# Patient Record
Sex: FEMALE | Race: BLACK OR AFRICAN AMERICAN | Employment: FULL TIME | URBAN - METROPOLITAN AREA
[De-identification: names, ages, dates, MRNs, and addresses within clinical notes are randomized per-mention and may not be internally consistent; named-entity substitution may affect disease eponyms.]

---

## 2020-04-28 ENCOUNTER — OFFICE VISIT (OUTPATIENT)
Dept: OBGYN CLINIC | Facility: CLINIC | Age: 50
End: 2020-04-28
Payer: COMMERCIAL

## 2020-04-28 VITALS
BODY MASS INDEX: 22.15 KG/M2 | SYSTOLIC BLOOD PRESSURE: 150 MMHG | HEIGHT: 63 IN | DIASTOLIC BLOOD PRESSURE: 88 MMHG | WEIGHT: 125 LBS

## 2020-04-28 DIAGNOSIS — D21.9 FIBROIDS: Primary | ICD-10-CM

## 2020-04-28 DIAGNOSIS — N94.6 DYSMENORRHEA: ICD-10-CM

## 2020-04-28 DIAGNOSIS — N92.0 MENORRHAGIA WITH REGULAR CYCLE: ICD-10-CM

## 2020-04-28 PROCEDURE — 99204 OFFICE O/P NEW MOD 45 MIN: CPT | Performed by: OBSTETRICS & GYNECOLOGY

## 2020-04-28 RX ORDER — KETOTIFEN FUMARATE 0.35 MG/ML
SOLUTION/ DROPS OPHTHALMIC AS NEEDED
COMMUNITY

## 2020-04-28 RX ORDER — MAGNESIUM OXIDE/MAG AA CHELATE 300 MG
CAPSULE ORAL
COMMUNITY

## 2020-04-28 NOTE — PROGRESS NOTES
NEW GYN H&P     2020  11:30 AM    Patient presents with:  New Patient: referred by PCP for fibroids. pt c/o pain with menses and intercourse   .     HPI: Patient is a 47year old  LMP 2020 here for evaluation of uterine fibroids and worsenin Past Surgical History:   Procedure Laterality Date   • COLON SURGERY  12/2019    Colonoscopy    • PATIENT DENIES ANY SURGICAL HISTORY      as of 04/28/2020       Penicillins             HIVES    Comment:Tongue swelling  Ragweed                 ITCHING uterine fundus at umbilicus, non tender  GYNE/:   External Genitalia: normal, no lesions, good perineal support  Urethra: meatus normal   Bladder: well supported  Vagina: normal mucosa, no lesions  Uterus: enlarged, globular, mobile  Cervix: normal os, n

## 2023-08-22 ENCOUNTER — APPOINTMENT (OUTPATIENT)
Dept: OTOLARYNGOLOGY | Age: 53
End: 2023-08-22